# Patient Record
Sex: MALE | Race: WHITE | Employment: OTHER | URBAN - NONMETROPOLITAN AREA
[De-identification: names, ages, dates, MRNs, and addresses within clinical notes are randomized per-mention and may not be internally consistent; named-entity substitution may affect disease eponyms.]

---

## 2022-10-12 PROBLEM — J18.9 PNEUMONIA OF RIGHT LOWER LOBE DUE TO INFECTIOUS ORGANISM: Status: ACTIVE | Noted: 2022-10-12

## 2022-11-28 PROBLEM — E10.9 TYPE 1 DIABETES MELLITUS WITHOUT COMPLICATION (HCC): Status: ACTIVE | Noted: 2022-11-28

## 2023-06-20 PROBLEM — M54.50 CHRONIC LOW BACK PAIN: Status: ACTIVE | Noted: 2023-06-20

## 2023-06-20 PROBLEM — R20.2 PARESTHESIA OF BOTH LEGS: Status: ACTIVE | Noted: 2023-06-20

## 2023-06-20 PROBLEM — G57.93 NEUROPATHY INVOLVING BOTH LOWER EXTREMITIES: Status: ACTIVE | Noted: 2023-06-20

## 2023-06-20 PROBLEM — G89.29 CHRONIC LOW BACK PAIN: Status: ACTIVE | Noted: 2023-06-20

## 2023-06-20 PROBLEM — M54.17 LUMBOSACRAL RADICULOPATHY: Status: ACTIVE | Noted: 2023-06-20

## 2024-04-09 ENCOUNTER — ANESTHESIA EVENT (OUTPATIENT)
Dept: OPERATING ROOM | Age: 84
DRG: 661 | End: 2024-04-09
Payer: MEDICARE

## 2024-04-09 ENCOUNTER — HOSPITAL ENCOUNTER (OUTPATIENT)
Dept: CT IMAGING | Age: 84
Discharge: HOME | DRG: 661 | End: 2024-04-11
Payer: MEDICARE

## 2024-04-09 ENCOUNTER — TELEPHONE (OUTPATIENT)
Dept: UROLOGY | Age: 84
End: 2024-04-09

## 2024-04-09 ENCOUNTER — HOSPITAL ENCOUNTER (INPATIENT)
Age: 84
LOS: 1 days | Discharge: HOME | DRG: 661 | End: 2024-04-11
Attending: UROLOGY | Admitting: UROLOGY
Payer: MEDICARE

## 2024-04-09 ENCOUNTER — HOSPITAL ENCOUNTER (OUTPATIENT)
Age: 84
Discharge: HOME | DRG: 661 | End: 2024-04-09
Payer: MEDICARE

## 2024-04-09 ENCOUNTER — APPOINTMENT (OUTPATIENT)
Dept: GENERAL RADIOLOGY | Age: 84
DRG: 661 | End: 2024-04-09
Attending: UROLOGY
Payer: MEDICARE

## 2024-04-09 ENCOUNTER — OFFICE VISIT (OUTPATIENT)
Dept: UROLOGY | Age: 84
DRG: 661 | End: 2024-04-09
Payer: MEDICARE

## 2024-04-09 ENCOUNTER — ANESTHESIA (OUTPATIENT)
Dept: OPERATING ROOM | Age: 84
DRG: 661 | End: 2024-04-09
Payer: MEDICARE

## 2024-04-09 VITALS
DIASTOLIC BLOOD PRESSURE: 68 MMHG | TEMPERATURE: 98 F | BODY MASS INDEX: 22.6 KG/M2 | HEART RATE: 66 BPM | SYSTOLIC BLOOD PRESSURE: 115 MMHG | WEIGHT: 140 LBS

## 2024-04-09 DIAGNOSIS — N13.30 HYDRONEPHROSIS DETERMINED BY ULTRASOUND: ICD-10-CM

## 2024-04-09 DIAGNOSIS — N13.2 URETERAL STONE WITH HYDRONEPHROSIS: ICD-10-CM

## 2024-04-09 DIAGNOSIS — N17.9 AKI (ACUTE KIDNEY INJURY) (HCC): ICD-10-CM

## 2024-04-09 DIAGNOSIS — R11.2 NAUSEA AND VOMITING, UNSPECIFIED VOMITING TYPE: ICD-10-CM

## 2024-04-09 DIAGNOSIS — N17.9 AKI (ACUTE KIDNEY INJURY) (HCC): Primary | ICD-10-CM

## 2024-04-09 PROBLEM — N30.00 ACUTE CYSTITIS WITHOUT HEMATURIA: Status: ACTIVE | Noted: 2024-04-09

## 2024-04-09 PROBLEM — J18.9 PNEUMONIA OF RIGHT LOWER LOBE DUE TO INFECTIOUS ORGANISM: Status: RESOLVED | Noted: 2022-10-12 | Resolved: 2024-04-09

## 2024-04-09 LAB
ANION GAP SERPL CALCULATED.3IONS-SCNC: 9 MMOL/L (ref 9–17)
BASOPHILS # BLD: 0.03 K/UL (ref 0–0.2)
BASOPHILS NFR BLD: 0 % (ref 0–2)
BUN SERPL-MCNC: 33 MG/DL (ref 8–23)
BUN/CREAT SERPL: 14 (ref 9–20)
CALCIUM SERPL-MCNC: 9.2 MG/DL (ref 8.6–10.4)
CHLORIDE SERPL-SCNC: 99 MMOL/L (ref 98–107)
CO2 SERPL-SCNC: 23 MMOL/L (ref 20–31)
CREAT SERPL-MCNC: 2.3 MG/DL (ref 0.7–1.2)
EKG ATRIAL RATE: 68 BPM
EKG P AXIS: 53 DEGREES
EKG P-R INTERVAL: 162 MS
EKG Q-T INTERVAL: 436 MS
EKG QRS DURATION: 142 MS
EKG QTC CALCULATION (BAZETT): 463 MS
EKG R AXIS: -103 DEGREES
EKG T AXIS: 27 DEGREES
EKG VENTRICULAR RATE: 68 BPM
EOSINOPHIL # BLD: 0.03 K/UL (ref 0–0.44)
EOSINOPHILS RELATIVE PERCENT: 0 % (ref 1–4)
ERYTHROCYTE [DISTWIDTH] IN BLOOD BY AUTOMATED COUNT: 12.9 % (ref 11.8–14.4)
GFR SERPL CREATININE-BSD FRML MDRD: 28 ML/MIN/1.73M2
GLUCOSE SERPL-MCNC: 126 MG/DL (ref 70–99)
HCT VFR BLD AUTO: 41.7 % (ref 40.7–50.3)
HGB BLD-MCNC: 14.2 G/DL (ref 13–17)
IMM GRANULOCYTES # BLD AUTO: 0.04 K/UL (ref 0–0.3)
IMM GRANULOCYTES NFR BLD: 0 %
LYMPHOCYTES NFR BLD: 1.98 K/UL (ref 1.1–3.7)
LYMPHOCYTES RELATIVE PERCENT: 17 % (ref 24–43)
MCH RBC QN AUTO: 30.2 PG (ref 25.2–33.5)
MCHC RBC AUTO-ENTMCNC: 34.1 G/DL (ref 28.4–34.8)
MCV RBC AUTO: 88.7 FL (ref 82.6–102.9)
MONOCYTES NFR BLD: 1.15 K/UL (ref 0.1–1.2)
MONOCYTES NFR BLD: 10 % (ref 3–12)
NEUTROPHILS NFR BLD: 73 % (ref 36–65)
NEUTS SEG NFR BLD: 8.34 K/UL (ref 1.5–8.1)
NRBC BLD-RTO: 0 PER 100 WBC
PLATELET # BLD AUTO: 213 K/UL (ref 138–453)
PMV BLD AUTO: 10.3 FL (ref 8.1–13.5)
POTASSIUM SERPL-SCNC: 4.7 MMOL/L (ref 3.7–5.3)
RBC # BLD AUTO: 4.7 M/UL (ref 4.21–5.77)
SODIUM SERPL-SCNC: 131 MMOL/L (ref 135–144)
WBC OTHER # BLD: 11.6 K/UL (ref 3.5–11.3)

## 2024-04-09 PROCEDURE — 99205 OFFICE O/P NEW HI 60 MIN: CPT | Performed by: NURSE PRACTITIONER

## 2024-04-09 PROCEDURE — 36415 COLL VENOUS BLD VENIPUNCTURE: CPT

## 2024-04-09 PROCEDURE — 80048 BASIC METABOLIC PNL TOTAL CA: CPT

## 2024-04-09 PROCEDURE — 93010 ELECTROCARDIOGRAM REPORT: CPT | Performed by: FAMILY MEDICINE

## 2024-04-09 PROCEDURE — 2580000003 HC RX 258: Performed by: NURSE ANESTHETIST, CERTIFIED REGISTERED

## 2024-04-09 PROCEDURE — 87086 URINE CULTURE/COLONY COUNT: CPT

## 2024-04-09 PROCEDURE — 2500000003 HC RX 250 WO HCPCS: Performed by: NURSE ANESTHETIST, CERTIFIED REGISTERED

## 2024-04-09 PROCEDURE — 6360000002 HC RX W HCPCS: Performed by: NURSE ANESTHETIST, CERTIFIED REGISTERED

## 2024-04-09 PROCEDURE — 74176 CT ABD & PELVIS W/O CONTRAST: CPT

## 2024-04-09 PROCEDURE — 85025 COMPLETE CBC W/AUTO DIFF WBC: CPT

## 2024-04-09 PROCEDURE — 93005 ELECTROCARDIOGRAM TRACING: CPT

## 2024-04-09 PROCEDURE — 1123F ACP DISCUSS/DSCN MKR DOCD: CPT | Performed by: NURSE PRACTITIONER

## 2024-04-09 RX ORDER — SODIUM CHLORIDE, SODIUM LACTATE, POTASSIUM CHLORIDE, CALCIUM CHLORIDE 600; 310; 30; 20 MG/100ML; MG/100ML; MG/100ML; MG/100ML
INJECTION, SOLUTION INTRAVENOUS CONTINUOUS PRN
Status: DISCONTINUED | OUTPATIENT
Start: 2024-04-09 | End: 2024-04-09 | Stop reason: SDUPTHER

## 2024-04-09 RX ORDER — PROPOFOL 10 MG/ML
INJECTION, EMULSION INTRAVENOUS PRN
Status: DISCONTINUED | OUTPATIENT
Start: 2024-04-09 | End: 2024-04-09 | Stop reason: SDUPTHER

## 2024-04-09 RX ORDER — LIDOCAINE HYDROCHLORIDE 20 MG/ML
INJECTION, SOLUTION EPIDURAL; INFILTRATION; INTRACAUDAL; PERINEURAL PRN
Status: DISCONTINUED | OUTPATIENT
Start: 2024-04-09 | End: 2024-04-09 | Stop reason: SDUPTHER

## 2024-04-09 RX ORDER — DEXAMETHASONE SODIUM PHOSPHATE 4 MG/ML
INJECTION, SOLUTION INTRA-ARTICULAR; INTRALESIONAL; INTRAMUSCULAR; INTRAVENOUS; SOFT TISSUE PRN
Status: DISCONTINUED | OUTPATIENT
Start: 2024-04-09 | End: 2024-04-09 | Stop reason: SDUPTHER

## 2024-04-09 RX ORDER — DEXMEDETOMIDINE HYDROCHLORIDE 100 UG/ML
INJECTION, SOLUTION INTRAVENOUS PRN
Status: DISCONTINUED | OUTPATIENT
Start: 2024-04-09 | End: 2024-04-09 | Stop reason: SDUPTHER

## 2024-04-09 RX ADMIN — DEXMEDETOMIDINE HCL 4 MCG: 100 INJECTION INTRAVENOUS at 18:14

## 2024-04-09 RX ADMIN — LIDOCAINE HYDROCHLORIDE 100 MG: 20 INJECTION, SOLUTION EPIDURAL; INFILTRATION; INTRACAUDAL; PERINEURAL at 16:48

## 2024-04-09 RX ADMIN — DEXMEDETOMIDINE HCL 4 MCG: 100 INJECTION INTRAVENOUS at 18:28

## 2024-04-09 RX ADMIN — DEXAMETHASONE SODIUM PHOSPHATE 4 MG: 4 INJECTION INTRA-ARTICULAR; INTRALESIONAL; INTRAMUSCULAR; INTRAVENOUS; SOFT TISSUE at 16:48

## 2024-04-09 RX ADMIN — PROPOFOL 150 MG: 10 INJECTION, EMULSION INTRAVENOUS at 16:48

## 2024-04-09 RX ADMIN — PHENYLEPHRINE HYDROCHLORIDE 100 MCG: 10 INJECTION INTRAVENOUS at 17:21

## 2024-04-09 RX ADMIN — DEXMEDETOMIDINE HCL 4 MCG: 100 INJECTION INTRAVENOUS at 18:31

## 2024-04-09 RX ADMIN — SODIUM CHLORIDE, POTASSIUM CHLORIDE, SODIUM LACTATE AND CALCIUM CHLORIDE: 600; 310; 30; 20 INJECTION, SOLUTION INTRAVENOUS at 16:45

## 2024-04-09 RX ADMIN — DEXMEDETOMIDINE HCL 4 MCG: 100 INJECTION INTRAVENOUS at 18:18

## 2024-04-09 RX ADMIN — DEXMEDETOMIDINE HCL 4 MCG: 100 INJECTION INTRAVENOUS at 18:16

## 2024-04-09 ASSESSMENT — ENCOUNTER SYMPTOMS
CONSTIPATION: 0
EYE REDNESS: 0
ABDOMINAL PAIN: 1
WHEEZING: 0
BACK PAIN: 0
VOMITING: 0
COUGH: 0
SHORTNESS OF BREATH: 0
NAUSEA: 1
COLOR CHANGE: 0

## 2024-04-09 ASSESSMENT — PAIN DESCRIPTION - DESCRIPTORS: DESCRIPTORS: STABBING

## 2024-04-09 ASSESSMENT — PAIN SCALES - GENERAL
PAINLEVEL_OUTOF10: 5
PAINLEVEL_OUTOF10: 5
PAINLEVEL_OUTOF10: 3

## 2024-04-09 ASSESSMENT — PAIN - FUNCTIONAL ASSESSMENT
PAIN_FUNCTIONAL_ASSESSMENT: FACE, LEGS, ACTIVITY, CRY, AND CONSOLABILITY (FLACC)
PAIN_FUNCTIONAL_ASSESSMENT: ACTIVITIES ARE NOT PREVENTED

## 2024-04-09 ASSESSMENT — PAIN DESCRIPTION - LOCATION: LOCATION: ABDOMEN

## 2024-04-09 ASSESSMENT — PAIN DESCRIPTION - ONSET: ONSET: SUDDEN

## 2024-04-09 ASSESSMENT — PAIN DESCRIPTION - PAIN TYPE: TYPE: ACUTE PAIN

## 2024-04-09 ASSESSMENT — PAIN DESCRIPTION - DIRECTION: RADIATING_TOWARDS: RIGHT FLANK

## 2024-04-09 ASSESSMENT — PAIN DESCRIPTION - ORIENTATION: ORIENTATION: RIGHT

## 2024-04-09 ASSESSMENT — PAIN DESCRIPTION - FREQUENCY: FREQUENCY: INTERMITTENT

## 2024-04-09 NOTE — PROGRESS NOTES
HPI:          Patient is a 83 y.o. male in no acute distress.  He is alert and oriented to person, place, and time.         New patient referral for right flank, RLQ pain and MARYSE.  He denies any dysuria, gross hematuria, frequency or urgency.  He did have a KUB and renal ultrasound completed.  These films are independently reviewed and shows a 2cm calcification along the course of the proximal right ureter.  The renal ultrasound shows right hydroureteronephrosis.  I do not see any right ureteral jet.  The hospital was not able to complete a CT scan because the machine was down. Creatinine is 1.8.  He did leave the ER yesterday AMA because he did not want to be admitted.  He has not had anything to eat or drink since before midnight except for Zofran this morning.    We did send him for a stat CT scan and repeat labs.  This film was independently reviewed and shows a 2 cm proximal right ureteral stone with significant hydronephrosis.  Creatinine is 2.3.  GFR 28.  WBC 11.6.        Past Medical History:   Diagnosis Date    Benign tumor of throat     at age 21    Diabetes mellitus (HCC)     Glaucoma     Kidney stone     Pneumonia      Past Surgical History:   Procedure Laterality Date    EMG  6/20/2023    LITHOTRIPSY N/A      Outpatient Encounter Medications as of 4/9/2024   Medication Sig Dispense Refill    ondansetron (ZOFRAN-ODT) 4 MG disintegrating tablet Place 1 tablet under the tongue every 8 hours as needed for Nausea or Vomiting May Sub regular tablet (non-ODT) if insurance does not cover ODT. 20 tablet 0    insulin glargine (LANTUS SOLOSTAR) 100 UNIT/ML injection pen Inject 12 Units into the skin nightly 5 Adjustable Dose Pre-filled Pen Syringe 1    tamsulosin (FLOMAX) 0.4 MG capsule Take 1 capsule by mouth daily for 5 days (Patient not taking: Reported on 4/9/2024) 5 capsule 0    HYDROcodone-acetaminophen (NORCO) 5-325 MG per tablet Take 1 tablet by mouth every 6 hours as needed for Pain for up to 3 days.

## 2024-04-09 NOTE — CARE COORDINATION
Case Management Assessment  Initial Evaluation    Date/Time of Evaluation: 4/9/2024 2:16 PM  Assessment Completed by: JOANNA Hayden    If patient is discharged prior to next notation, then this note serves as note for discharge by case management.    Patient Name: Jose Yanes                   YOB: 1940  Diagnosis: Acute kidney injury (HCC) [N17.9]  Stone in kidney [N20.0]  MARYSE (acute kidney injury) (HCC) [N17.9]                   Date / Time: 4/9/2024 12:43 PM    Patient Admission Status: Observation   Readmission Risk (Low < 19, Mod (19-27), High > 27): No data recorded  Current PCP: Beitler, Claude, PA-C  PCP verified by CM? Yes    Chart Reviewed: Yes      History Provided by: Patient, Significant Other  Patient Orientation: Alert and Oriented, Person, Place, Situation, Self    Patient Cognition: Alert    Hospitalization in the last 30 days (Readmission):  No    If yes, Readmission Assessment in CM Navigator will be completed.    Advance Directives:      Code Status: Not on file   Patient's Primary Decision Maker is: Legal Next of Kin    Primary Decision Maker: AMAN YANES - Spouse - [phone]    Discharge Planning:    Patient lives with: Spouse/Significant Other Type of Home: House  Primary Care Giver: Self  Patient Support Systems include: Spouse/Significant Other, Family Members   Current Financial resources: Medicare, Other (Comment) (commercial)  Current community resources: None  Current services prior to admission: None            Current DME:              Type of Home Care services:  None    ADLS  Prior functional level: Independent in ADLs/IADLs  Current functional level: Independent in ADLs/IADLs    PT AM-PAC:   /24  OT AM-PAC:   /24    Family can provide assistance at DC: Yes  Would you like Case Management to discuss the discharge plan with any other family members/significant others, and if so, who? No  Plans to Return to Present Housing: Yes  Other Identified

## 2024-04-09 NOTE — CONSULTS
CECILIA Kirkpatrick-CNP  Inpatient Consult Note 4/9/24    Patient:  Jose Reyes  MRN: [id number]    Reason for Consultation: Medical management    Requesting Physician: Desiree Tomlinson CNP urology    History Obtained From:  patient, electronic medical record  PCP: Beitler, Claude, PA-C    History of Present Illness:   The patient is a 83 y.o. male who presented as a direct admission from urology office with right flank pain and right lower quadrant abdominal pain as well as MARYSE.  Patient was seen in the emergency room on 4/8/2024 for complaints of flank pain nausea and vomiting.  Symptom onset 2 days.  He denied dysuria, frequency or urgency.  He reported history of kidney stones.  Patient was afebrile at that time.  Ultrasound of his kidneys showed mild to severe right hydronephrosis without right ureteral jet into the bladder consistent with ureteral obstruction.  Urinalysis was positive for UTI and he was started on oral Keflex.  BUN was 26 and 1.86.  At that time they were recommending admission however patient left AMA as he did not want to be admitted.  Patient returned to urology office today for workup and was sent for repeat imaging as well as labs.  His creatinine was elevated to 2.3 with a mild leukocytosis of 11.6.  CT scan showed 2 cm proximal ureteral stone with significant hydronephrosis.  Plan was for cystoscopy with stent placement and stone therapy and patient was agreeable to admission.  Patient states no bowel movement since Saturday evening.  He states normally he has 3 or 4 a day.  He states he just does not feel well.    Past Medical History:        Diagnosis Date    Benign tumor of throat     at age 21    Diabetes mellitus (HCC)     Glaucoma     Kidney stone     Pneumonia        Past Surgical History:        Procedure Laterality Date    EMG  6/20/2023    LITHOTRIPSY N/A        Medications Prior to Admission:    Prior to Admission medications    Medication Sig Start Date End Date Taking?  treatment would jeopardize the patient's health    []  I did NOT confirm, update or review the patient's current list of medications today.  [DOES NOT SATISFY MIPS PERFORMANCE]    Critical Care Time:  Total critical care time caring for this patient with life threatening, unstable organ failure, including direct patient contact, management of life support systems, review of data including imaging and labs, discussions with other team members and physicians at least 0 minutes so far today, excluding separately billable procedures.    Thank you very much for allowing me to participate in the care of this patient.     Dominique Burns, APRN - CNP, APRN-CNP

## 2024-04-09 NOTE — ANESTHESIA PRE PROCEDURE
Status: Time of last liquid consumption: 0800                        Time of last solid consumption: 1600                        Date of last liquid consumption: 04/09/24                        Date of last solid food consumption: 04/08/24    BMI:   Wt Readings from Last 3 Encounters:   04/09/24 64 kg (141 lb)   04/09/24 63.5 kg (140 lb)   04/08/24 63.5 kg (140 lb)     Body mass index is 22.76 kg/m².    CBC:   Lab Results   Component Value Date/Time    WBC 11.6 04/09/2024 10:43 AM    RBC 4.70 04/09/2024 10:43 AM    RBC 5.24 04/08/2024 12:26 PM    HGB 14.2 04/09/2024 10:43 AM    HCT 41.7 04/09/2024 10:43 AM    MCV 88.7 04/09/2024 10:43 AM    RDW 12.9 04/09/2024 10:43 AM     04/09/2024 10:43 AM       CMP:   Lab Results   Component Value Date/Time     04/09/2024 10:43 AM    K 4.7 04/09/2024 10:43 AM    CL 99 04/09/2024 10:43 AM    CO2 23 04/09/2024 10:43 AM    BUN 33 04/09/2024 10:43 AM    CREATININE 2.3 04/09/2024 10:43 AM    LABGLOM 28 04/09/2024 10:43 AM    GLUCOSE 126 04/09/2024 10:43 AM    GLUCOSE 167 04/08/2024 12:26 PM    PROT 6.5 12/06/2022 04:17 PM    CALCIUM 9.2 04/09/2024 10:43 AM    BILITOT 1.2 12/06/2022 04:17 PM    ALKPHOS 126 12/06/2022 04:17 PM    AST 32 12/06/2022 04:17 PM    ALT 14 12/06/2022 04:17 PM       POC Tests: No results for input(s): \"POCGLU\", \"POCNA\", \"POCK\", \"POCCL\", \"POCBUN\", \"POCHEMO\", \"POCHCT\" in the last 72 hours.    Coags: No results found for: \"PROTIME\", \"INR\", \"APTT\"    HCG (If Applicable): No results found for: \"PREGTESTUR\", \"PREGSERUM\", \"HCG\", \"HCGQUANT\"     ABGs: No results found for: \"PHART\", \"PO2ART\", \"QIN9UER\", \"HRG0HLE\", \"BEART\", \"F1PJITMQ\"     Type & Screen (If Applicable):  No results found for: \"LABABO\", \"LABRH\"    Drug/Infectious Status (If Applicable):  No results found for: \"HIV\", \"HEPCAB\"    COVID-19 Screening (If Applicable): No results found for: \"COVID19\"        Anesthesia Evaluation     no history of anesthetic complications:   Airway: Mallampati:

## 2024-04-09 NOTE — TELEPHONE ENCOUNTER
New patient-ER - try to put him on at 945 this morning.  Make sure he has not eaten or drank anything.

## 2024-04-09 NOTE — H&P (VIEW-ONLY)
Pipe    Smokeless tobacco: Never   Vaping Use    Vaping Use: Never used   Substance and Sexual Activity    Alcohol use: Never    Drug use: Never    Sexual activity: Not on file   Other Topics Concern    Not on file   Social History Narrative    Not on file     Social Determinants of Health     Financial Resource Strain: Not on file   Food Insecurity: No Food Insecurity (4/9/2024)    Hunger Vital Sign     Worried About Running Out of Food in the Last Year: Never true     Ran Out of Food in the Last Year: Never true   Transportation Needs: No Transportation Needs (4/9/2024)    PRAPARE - Transportation     Lack of Transportation (Medical): No     Lack of Transportation (Non-Medical): No   Physical Activity: Not on file   Stress: Not on file   Social Connections: Not on file   Intimate Partner Violence: Not on file   Housing Stability: Low Risk  (4/9/2024)    Housing Stability Vital Sign     Unable to Pay for Housing in the Last Year: No     Number of Places Lived in the Last Year: 1     Unstable Housing in the Last Year: No       Family History:    Family History   Problem Relation Age of Onset    Unknown Mother     Unknown Father        REVIEW OF SYSTEMS:  Constitutional: Negative for fever, chills and unexpected weight change.   Respiratory: Negative for shortness of breath and wheezing.   Cardiovascular: Negative for chest pain and palpitations.   Gastrointestinal: Positive for nausea and abdominal pain, negative for vomiting  Endocrine: Negative for polydipsia and polyuria.   Genitourinary: Positive for flank pain. Negative for dysuria, gross hematuria  Musculoskeletal: Negative for myalgias and joint swelling.   Skin: Negative for rash and wound.   Neurological: Negative for dizziness and headaches.   Hematological: Negative for adenopathy. Does not bruise/bleed easily.     PHYSICAL EXAM:  No data found.  Constitutional: Patient resting comfortably, in no acute distress.   Neuro: Alert and oriented to person place

## 2024-04-09 NOTE — ANESTHESIA POSTPROCEDURE EVALUATION
Department of Anesthesiology  Postprocedure Note    Patient: Jose Reyes  MRN: [id number]  YOB: 1940  Date of evaluation: 4/9/2024    Procedure Summary       Date: 04/09/24 Room / Location: [facility]    Anesthesia Start: 1645 Anesthesia Stop: 1745    Procedures:       CYSTOSCOPY URETEROSCOPY LASER- HLL AND CYSTOLITHOLAPAXY (Right)      CYSTOSCOPY URETERAL STENT INSERTION (Right) Diagnosis:       Acute kidney injury (HCC)      (Acute kidney injury (HCC) [N17.9])    Surgeons: Mehul Perry MD Responsible Provider: Migdalia Dolan APRN - CRNA    Anesthesia Type: general ASA Status: 2            Anesthesia Type: No value filed.    Delfino Phase I: Delfino Score: 10    Delfino Phase II: Delfino Score: 9    Anesthesia Post Evaluation    Patient location during evaluation: PACU  Patient participation: complete - patient participated  Level of consciousness: awake and alert  Pain score: 0  Airway patency: patent  Nausea & Vomiting: no vomiting and no nausea  Cardiovascular status: blood pressure returned to baseline  Respiratory status: acceptable  Hydration status: stable  Multimodal analgesia pain management approach  Pain management: adequate    No notable events documented.

## 2024-04-10 LAB
MICROORGANISM SPEC CULT: NORMAL
SPECIMEN DESCRIPTION: NORMAL

## 2024-04-10 NOTE — PROGRESS NOTES
Pt urine remains dark brown to dark red and without stone debris noted upon straining each output. Pain with urination has improved t/o shift. Will continue to monitor.

## 2024-04-10 NOTE — PROGRESS NOTES
Pt is A&Ox4, calm and cooperative. Assmt and VS completed as charted, see flow sheet. IV fluids continue as ordered, see MAR. Pt's urine is dark red, without stone debris noted upon straining. Pt c/o pain with urination. Pt resting in chair, call light within reach, denies pain except with urination, denies further needs. Will continue to monitor.

## 2024-04-10 NOTE — CONSULTS
Discussion this morning with Jose. States that his wife manages his ACP documents and he knows that he has paper work they completed some years ago but is unsure who he has listed as decision makers. Asks writer to come back later today when his wife returns.     Spoke with patient at his wife Jacque. Jacque states that Jose has a health care power of  and a living will at home with Jacque listed as primary decision maker, followed by their daughter Lois then their daughter Beba. ACP decision makers updated at this time. Copy of ACP documents requested when she is able. Jose is currently a full code and wishes to remain so. States that he would be ok with intubation short term but would never want to be on a ventilator long term. He shares about his mother having a stroke and not passing for 9 months. Support provided. Denies further needs at this time.    Shaye Givens RN  [facility]   Palliative Care Nurse Coordinator  4/10/2024 3:13 PM

## 2024-04-10 NOTE — ACP (ADVANCE CARE PLANNING)
Advance Care Planning     Palliative Team Advance Care Planning (ACP) Conversation    Date of Conversation: 04/10/24    Key individuals present for the conversation:  Patient with decision making capacity    Other individuals present: Spouse Wife Jacque     ACP documents on file prior to discussion:    [x] Advance Directive  [] Portable DNR  [] POLST  [] [facility] M[facility]  [] None    Healthcare Decision Maker:    Primary Decision Maker: JACQUE YANES - Spouse - [phone]    Secondary Decision Maker: Alis Bennetta - Child - [phone]    Supplemental (Other) Decision Maker: Beba Hernandez - Child - [phone]     Conversation Summary: See PC note    Resuscitation Status:   Code Status: Not on file     Completed Documentation:    [] Advance Directive  [] Portable DNR  [] POLST  [] [facility]  [x] No new documents completed    I spent 10 minutes with the patient and/or surrogate decision maker discussing the patient's wishes and goals as detailed in the above note.     Shaye Givens RN

## 2024-04-10 NOTE — PROGRESS NOTES
Patient returned to floor at this time. Patient alert and oriented x4 and is currently denying pain. Patient assisted with standing at bedside and using the urinal and output was bloody. Patient assessment and vitals completed at this time as well. Patient states no current needs, call light is in reach, plan of care is ongoing.

## 2024-04-10 NOTE — PLAN OF CARE
Problem: Discharge Planning  Goal: Discharge to home or other facility with appropriate resources  Outcome: Progressing     Problem: Safety - Adult  Goal: Free from fall injury  Outcome: Progressing     Problem: Pain  Goal: Verbalizes/displays adequate comfort level or baseline comfort level  Outcome: Progressing     Problem: Chronic Conditions and Co-morbidities  Goal: Patient's chronic conditions and co-morbidity symptoms are monitored and maintained or improved  Outcome: Progressing     Problem: Nutrition Deficit:  Goal: Optimize nutritional status  4/10/2024 1720 by Avelina Wilson RN  Outcome: Progressing  4/10/2024 0820 by Blanco Lezama, RD, LD  Outcome: Progressing  Flowsheets (Taken 4/10/2024 0820)  Nutrient intake appropriate for improving, restoring, or maintaining nutritional needs: Monitor oral intake, labs, and treatment plans  Note: Nutrition Problem #1: Altered nutrition-related lab values  Intervention: Food and/or Nutrient Delivery: Continue Current Diet

## 2024-04-10 NOTE — OP NOTE
[address] [address]                            OPERATIVE REPORT      PATIENT NAME: MANDEEP YANES               : 1940  MED REC NO: [id number]                          ROOM: 0302  ACCOUNT NO: [id number]                       ADMIT DATE: 2024  PROVIDER: Mehul Perry MD      DATE OF PROCEDURE:  2024    SURGEON:  Mehul Perry MD    ASSISTANT:  None.    PREOPERATIVE DIAGNOSIS:  Right ureteral calculus.    POSTOPERATIVE DIAGNOSIS:  Right ureteral calculus.    PROCEDURES:    1. Cystoscopy, right ureteroscopy, right holmium laser lithotripsy, right ureteral stent placement.  2. Cystolitholapaxy, small.    ANESTHESIA:  General.    COMPLICATIONS:  None.    ESTIMATED BLOOD LOSS:  Minimal.    SPECIMENS:  None.    PROSTHESIS:  A 6-Cymraes x 26 cm double-J ureteral stent.    DISPOSITION:  Stable.    FINDINGS:    1. 2-cm stone in the right ureter.  2. 1 to 2 cm stone burden in the bladder.    INDICATIONS:  The patient is an 83-year-old male who has extensive stone disease on the right side, here now for definitive therapy in the form of right-sided holmium laser lithotripsy.    DESCRIPTION OF PROCEDURE:  The patient was taken back to the operating room after informed consent including all risks, benefits, and alternatives was obtained.  The patient was transferred from [facility] onto the operating table, where he was induced under general anesthesia, given IV Ancef for preoperative antibiotic prophylaxis.  To begin the case, he was prepped and draped in normal sterile fashion, placed in dorsal lithotomy.  He had a 22-Cymraes sheath 30-degree lens passed through the urethra into the bladder.  Once in the bladder, we identified the right ureteral orifice.  We could see stones in the prostatic urethra and bladder.  These were pushed into the bladder.  At this point in time, we were able to place a 0.035-inch wire up.  We then

## 2024-04-10 NOTE — CONSULTS
CECILIA Kirkpatrick-CNP  Inpatient Consult Note 4/9/24    Patient:  Jose Reyes  MRN: [id number]    Reason for Consultation: Medical management    Requesting Physician: Desiree Tomlinson CNP urology    History Obtained From:  patient, electronic medical record  PCP: Beitler, Claude, PA-C    Subjective:   The patient is a 83 y.o. male who resting in bed with wife at side no distress. Urinating dark red urine. Afebrile. No further n/v.  No complaints of SOB or CP.        Review of Systems:  Pertinent items are noted in HPI.    Objective:    Vitals:   Vitals:    04/10/24 0415   BP: (!) 156/69   Pulse: 73   Resp:    Temp:    SpO2: 93%     Weight - Scale: 64 kg (141 lb)   Height: 167.6 cm (5' 6\")   -----------------------------------------------------------------  Exam:  General Appearance:  awake, alert, oriented, in no acute distress and appears frail  Head/face:  NCAT  Eyes:  No gross abnormalities. and PERRL  Nose/Sinuses:  negative  Mouth/Throat:  Mucosa moist.  No lesions.  Pharynx without erythema, edema or exudate.  Neck:  neck- supple, no mass, non-tender  Lungs:  Normal expansion.  Clear to auscultation.  No rales, rhonchi, or wheezing.  Heart:  Heart sounds are normal.  Regular rate and rhythm without murmur, gallop or rub.  Abdomen: Soft right lower quadrant tenderness with right CVA tenderness.  Bowel sounds active.  Genitourinary:  dark red urine  Extremities: Extremities warm to touch, pink, with no edema.  Musculoskeletal:  negative  Peripheral Pulses:  Normal, Capillary refill <2secs, strong peripheral pulses  Neurologic:  negative  Skin:  Skin color, texture, turgor normal. No rashes or lesions.      -----------------------------------------------------------------  Diagnostic Data: Reviewed    Assessment:          Principal Problem:    Ureteral stone with hydronephrosis  Active Problems:    Type 1 diabetes mellitus without complication (HCC)    Paresthesia of both legs    MARYSE (acute kidney injury)

## 2024-04-10 NOTE — PROGRESS NOTES
Urology Progress Note    Subjective:   Patient is doing well.  He states that he is much more comfortable.  He is awake and alert.  He has no family at bedside.    Objective:  Patient is postop day 1 right HL L with right ureteral stent placement.  Patient also had a cystolitholopaxy.  Patient states that he had some urinary frequency but this is slowed down.  He does have hematuria.  Patient's creatinine was this morning was 2.4.  This is very similar to prior to the procedure which was 2.3.  Patient is getting 100 mL of normal saline an hour.  He denies any pain.  Patient's white blood cell count to go up to 14.3.  Patient is currently on Rocephin.  Cultures are pending.      REVIEW OF SYSTEMS:  Constitutional: Negative for fever, chills and unexpected weight change.   Respiratory: Negative for shortness of breath and wheezing.   Cardiovascular: Negative for chest pain and palpitations.   Gastrointestinal: Negative for nausea, vomiting, diarrhea, constipation  Endocrine: Negative for polydipsia and polyuria.   Genitourinary: Positive for hematuria, negative for urgency, negative for dysuria  Musculoskeletal: Negative for myalgias and joint swelling.   Skin: Negative for rash and wound.   Neurological: Negative for dizziness and headaches.   Hematological: Negative for adenopathy.     PHYSICAL EXAM:  Constitutional: Patient resting comfortably, in no acute distress.   Neuro: Alert and oriented to person place and time.   Psych: Mood and affect normal.  Skin: Warm, dry, non-diaphoretic  HEENT: normocephalic, atraumatic  Lungs: Respiratory effort normal, unlabored  Cardiovascular:  Normal peripheral pulses  Abdomen: Soft, non-tender, non-distended   : No CVA tenderness  Genital/Rectal: Not done  Extremities: Calves are non-tender, no edema    Patient Vitals for the past 24 hrs:   BP Temp Temp src Pulse Resp SpO2 Height Weight   04/10/24 0747 -- -- -- -- -- -- 1.676 m (5' 6\") --   04/10/24 0730 (!) 128/54 97.6 °F

## 2024-04-10 NOTE — PROGRESS NOTES
Comprehensive Nutrition Assessment    Type and Reason for Visit:  Initial    Nutrition Recommendations/Plan:   Encourage consistent carbohydrate in diet.      Malnutrition Assessment:  Malnutrition Status:  No malnutrition (04/10/24 0818)    Context:  Acute Illness     Findings of the 6 clinical characteristics of malnutrition:  Energy Intake:  Mild decrease in energy intake (Comment) (since Sunday)  Weight Loss:  No significant weight loss     Body Fat Loss:  No significant body fat loss     Muscle Mass Loss:  No significant muscle mass loss    Fluid Accumulation:  No significant fluid accumulation     Strength:  Not Performed    Nutrition Assessment:    Altered nutrition related labs r/t renal dysfunction, AEB elevated bun/creat post cystoscopy with stent. History of dysphagia which is fully corrected per interview. Diabetes control per reported A1C 6.3-6.5 (last records 8.2 in 1/23 in Epic). Wife reports use of stem cell patches for diabetes control. Could not report monitoring carbohydrate, only reports choosing organic and grass fed items when asking about carbohydrate.  Patient states hunger this AM and eager for breakfast. States good use of water for hydration regularly.    Nutrition Related Findings:    active b/s. no edema. Wound Type: None       Current Nutrition Intake & Therapies:    Average Meal Intake: Unable to assess (no PO records)  Average Supplements Intake: None Ordered  ADULT DIET; Regular; 3 carb choices (45 gm/meal)    Anthropometric Measures:  Height: 167.6 cm (5' 6\")  Ideal Body Weight (IBW): 142 lbs (65 kg)    Admission Body Weight: 64 kg (141 lb)  Current Body Weight: 64 kg (141 lb), 99.3 % IBW. Weight Source: Bed Scale  Current BMI (kg/m2): 22.8  Usual Body Weight: 63.5 kg (140 lb) (123# a year ago?)  % Weight Change (Calculated): 0.7  Weight Adjustment For: No Adjustment                 BMI Categories: Normal Weight (BMI 18.5-24.9)    Estimated Daily Nutrient Needs:  Energy  Requirements Based On: Kcal/kg  Weight Used for Energy Requirements: Current  Energy (kcal/day): 0554-9691 (23-28)  Weight Used for Protein Requirements: Current  Protein (g/day): 70-83 (1.1-1.3)  Method Used for Fluid Requirements: 1 ml/kcal  Fluid (ml/day): 1800    Nutrition Diagnosis:   Altered nutrition-related lab values related to renal dysfunction as evidenced by lab values    Lab Results   Component Value Date     04/10/2024    K 4.8 04/10/2024     04/10/2024    CO2 19 (L) 04/10/2024    BUN 38 (H) 04/10/2024    CREATININE 2.4 (H) 04/10/2024    GLUCOSE 130 (H) 04/10/2024    CALCIUM 8.4 (L) 04/10/2024    PROT 6.5 12/06/2022    LABALBU 3.7 12/06/2022    BILITOT 1.2 12/06/2022    ALKPHOS 126 12/06/2022    AST 32 12/06/2022    ALT 14 12/06/2022    LABGLOM 26 (L) 04/10/2024     Hemoglobin A1C   Date Value Ref Range Status   01/30/2023 8.2 (H) 4.0 - 5.6 % Final     Comment:       American Diabetes Association guidelines indicate that patients with HgbA1C in the range of   5.7-6.4% are at increased risk for development of diabetes, and intervention by lifestyle   modification may be beneficial. HgbA1C greater than or equal to 6.5% is considered   diagnostic of diabetes.    Hemoglobin A1c should not be used in patients with homozygous sickle cell trait, hemolytic   anemia, or other hemolytic diseases and recent significant or chronic blood loss or blood   transfusions. Alternative forms of testing such as glycated serum protein or glycated   albumin should be considered for these patients.       No results found for: \"VITD25\"    Nutrition Interventions:   Food and/or Nutrient Delivery: Continue Current Diet  Nutrition Education/Counseling: No recommendation at this time  Coordination of Nutrition Care: Continue to monitor while inpatient  Plan of Care discussed with: patient and spouse    Goals:     Goals: Meet at least 75% of estimated needs       Nutrition Monitoring and Evaluation:

## 2024-04-10 NOTE — PROGRESS NOTES
Call received from Dr. Perry regarding if pt is voiding, informed he has voided 400ml since 0600, red urine, no stone fragments noted. N.O. Post Void Residual once and report findings to Dr. Perry. Will continue to monitor.

## 2024-04-11 PROBLEM — N17.9 ACUTE KIDNEY INJURY (HCC): Status: ACTIVE | Noted: 2024-04-11

## 2024-04-11 NOTE — PROGRESS NOTES
Reviewed discharge instructions with patient and spouse.  Patient aware of no new medications.  Reviewed home medications with patient and when next dose is due.  Patient aware of need for repeat labs for 4/15/24.  Lab order forms given to patient.  Aware of follow up appointment with Dr. Perry.  Patient and wife stated that they don't see Claude Beitler, PA-C anymore that they had switched to Young Warner.  Wife stated that he already has an appointment with Dr. Warner on 4/23/24.  Reviewed diet, activity, and Dr. Perry's cystoscopy instructions.  Educational handout given on Ureteral Stent Placement: Post-op.  Questions answered.  Verbalizes understanding.  Copy of discharge instructions given to patient.  Writer informed patient and spouse that we would cancel the appointment with Claude Beitler and take his name off list as the PCP and add Dr. Young Warner.

## 2024-04-11 NOTE — DISCHARGE INSTR - DIET
Good nutrition is important when healing from an illness, injury, or surgery.  Follow any nutrition recommendations given to you during your hospital stay.   If you were given an oral nutrition supplement while in the hospital, continue to take this supplement at home.  You can take it with meals, in-between meals, and/or before bedtime. These supplements can be purchased at most local grocery stores, pharmacies, and chain [employer]-stores.   If you have any questions about your diet or nutrition, call the hospital and ask for the dietitian.  Regular, increase fluid intakes

## 2024-04-11 NOTE — PROGRESS NOTES
Urology Progress Note  CC: Right flank pain    Subjective: Afebrile vital signs stable    Patient Vitals for the past 24 hrs:   BP Temp Temp src Pulse Resp SpO2 Weight   04/11/24 0702 (!) 155/82 97.4 °F (36.3 °C) Temporal 73 18 93 % --   04/11/24 0400 -- -- -- -- -- -- 64.9 kg (143 lb)   04/11/24 0345 (!) 121/58 97.8 °F (36.6 °C) Temporal 71 16 96 % --   04/10/24 1845 136/62 97.1 °F (36.2 °C) Temporal 72 16 96 % --   04/10/24 1530 (!) 135/58 97.3 °F (36.3 °C) Temporal 61 18 98 % --       Intake/Output Summary (Last 24 hours) at 4/11/2024 0951  Last data filed at 4/11/2024 0130  Gross per 24 hour   Intake 1040 ml   Output 2425 ml   Net -1385 ml       Recent Labs     04/09/24  1043 04/10/24  0540 04/11/24  0510   WBC 11.6* 14.3* 9.4   HGB 14.2 12.4* 12.3*   HCT 41.7 35.1* 35.9*   MCV 88.7 86.5 86.9    179 202     Recent Labs     04/09/24  1043 04/10/24  0540 04/11/24  0510   * 136 140   K 4.7 4.8 4.4   CL 99 102 109*   CO2 23 19* 22   BUN 33* 38* 44*   CREATININE 2.3* 2.4* 1.9*       Recent Labs     04/08/24  1151   COLORU Yellow   WBCUA 26-50   RBCUA 26-50   MUCUS None Seen   BACTERIA Trace   CLARITYU Slightly Cloudy   LEUKOCYTESUR Moderate*   UROBILINOGEN 0.2 E.U./dL   BILIRUBINUR Negative   BLOODU Moderate*       Additional Lab/culture results:     ROS:  Constitutional:  Negative for appetite change, chills and fever.   Eyes:  Negative for redness and visual disturbance.   Respiratory:  Negative for cough, shortness of breath and wheezing.    Cardiovascular:  Negative for chest pain and leg swelling.   Gastrointestinal:  Negative for abdominal pain, constipation, nausea and vomiting.   Genitourinary:  Negative for decreased urine volume, difficulty urinating, dysuria, enuresis, flank pain, frequency, hematuria, penile discharge, penile pain, scrotal swelling, testicular pain and urgency.   Musculoskeletal:  Negative for back pain, joint swelling and myalgias.   Skin:  Negative for color change, rash

## 2024-04-11 NOTE — PLAN OF CARE
Problem: Discharge Planning  Goal: Discharge to home or other facility with appropriate resources  4/11/2024 1059 by Irene Warner, RN  Outcome: Progressing  4/10/2024 2244 by Hilda Valdivia RN  Outcome: Progressing     Problem: Safety - Adult  Goal: Free from fall injury  4/11/2024 1059 by Irene Warner, RN  Outcome: Progressing  4/10/2024 2244 by Hilda Valdivia RN  Outcome: Progressing     Problem: Pain  Goal: Verbalizes/displays adequate comfort level or baseline comfort level  4/11/2024 1059 by Irene Warner, RN  Outcome: Progressing  4/10/2024 2244 by Hilda Valdivia RN  Outcome: Progressing     Problem: Chronic Conditions and Co-morbidities  Goal: Patient's chronic conditions and co-morbidity symptoms are monitored and maintained or improved  4/11/2024 1059 by Irene Warner RN  Outcome: Progressing  4/10/2024 2244 by Hilda Valdivia RN  Outcome: Progressing     Problem: Nutrition Deficit:  Goal: Optimize nutritional status  4/11/2024 1059 by Irene Warner, RN  Outcome: Progressing  4/10/2024 2244 by Hilda Valdivia RN  Outcome: Progressing

## 2024-04-11 NOTE — DISCHARGE INSTRUCTIONS
SAME DAY SURGERY DISCHARGE INSTRUCTIONS    1.  Do not drive or operate hazardous machinery for 24 hours.    2.  Do not make important personal or business decisions for 24 hours.    3.  Do not drink alcoholic beverages for 24 hours.    4.  Do not smoke tobacco products for 24 hours.    5.  Eat light foods (Jell-O, soups, etc....) and drink plenty of fluids (water, Sprite, etc...) up to 8 glasses per day, as you can tolerate.    6.  Limit your activities for 24 hours.  Do not engage in heavy work until your surgeon gives you permission.      7.  Patient should not be left alone for 12-24 hours following surgical procedure.    8.  Wash hands before and after incision care.  It is important to practice good personal hygiene during the post op period.    9.  Call your surgeon for any questions regarding your surgery.    CYSTOSCOPY DISCHARGE INSTRUCTIONS    Possible burning during urination and/or blood tinged urine.    Drink 6-8 glasses of water for the next day or so.  (This helps to flush the urinary tract.)    Call Dr. Perry ([phone]) if you develop:    Fever over 100 degrees  Prolonged soreness/pain  Unusual bleeding/bruising  Unable to urinate or if urine is bloody  You cannot pass urine 8 hours after the test.  You have pain in your belly or your back just below your rib cage.  (This is called flank pain.)  You have frequent urge to urinate but can pass only small amounts of urine.    Call Dr. Perry office for follow-up appointment ([phone]).

## 2024-04-11 NOTE — PLAN OF CARE
Problem: Discharge Planning  Goal: Discharge to home or other facility with appropriate resources  4/11/2024 1123 by Silvia Parham RN  Outcome: Adequate for Discharge  4/11/2024 1059 by Irene Warner RN  Outcome: Progressing  4/10/2024 2244 by Hilda Valdivia RN  Outcome: Progressing     Problem: Safety - Adult  Goal: Free from fall injury  4/11/2024 1123 by Silvia Parham RN  Outcome: Adequate for Discharge  4/11/2024 1059 by Irene Warner RN  Outcome: Progressing  4/10/2024 2244 by Hilda Valdivia RN  Outcome: Progressing     Problem: Pain  Goal: Verbalizes/displays adequate comfort level or baseline comfort level  4/11/2024 1123 by Silvia Parham RN  Outcome: Adequate for Discharge  4/11/2024 1059 by Irene Warner RN  Outcome: Progressing  4/10/2024 2244 by Hilda Valdivia RN  Outcome: Progressing     Problem: Chronic Conditions and Co-morbidities  Goal: Patient's chronic conditions and co-morbidity symptoms are monitored and maintained or improved  4/11/2024 1123 by Silvia Parham RN  Outcome: Adequate for Discharge  4/11/2024 1059 by Irene Warner RN  Outcome: Progressing  4/10/2024 2244 by Hilda Valdivia RN  Outcome: Progressing     Problem: Nutrition Deficit:  Goal: Optimize nutritional status  4/11/2024 1123 by Silvia Parham RN  Outcome: Adequate for Discharge  4/11/2024 1059 by Irene Warner RN  Outcome: Progressing  4/10/2024 2244 by Hilda Valdivia RN  Outcome: Progressing

## 2024-04-11 NOTE — PROGRESS NOTES
Writer notified PETER Valente that patient was seeing things when he closed his eyes that were not there. New order received, call light is in reach, plan of care is ongoing.

## 2024-04-11 NOTE — PROGRESS NOTES
Patient shift assessment and vitals completed at this time as charted. Patient is alert and oriented x4 and is sitting up in chair with wife at his side. Patient denies pain at this time and states no current needs, call light is in reach, plan of care is ongoing.

## 2024-04-11 NOTE — CONSULTS
CECILIA Kirkpatrick-CNP  Inpatient Consult Note 4/9/24    Patient:  Jose Reyes  MRN: [id number]    Reason for Consultation: Medical management    Requesting Physician: Desiree Tomlinson CNP urology    History Obtained From:  patient, electronic medical record  PCP: Beitler, Claude, PA-C    Subjective:   The patient is a 83 y.o. male who resting in bed with wife at side no distress. No complaints. Slept well. Afebrile.         Review of Systems:  Pertinent items are noted in HPI.    Objective:    Vitals:   Vitals:    04/11/24 0345   BP: (!) 121/58   Pulse: 71   Resp: 16   Temp: 97.8 °F (36.6 °C)   SpO2: 96%     Weight - Scale: 64.9 kg (143 lb)   Height: 167.6 cm (5' 6\")   -----------------------------------------------------------------  Exam:  General Appearance:  awake, alert, oriented, in no acute distress and appears frail  Head/face:  NCAT  Eyes:  No gross abnormalities. and PERRL  Nose/Sinuses:  negative  Mouth/Throat:  Mucosa moist.  No lesions.  Pharynx without erythema, edema or exudate.  Neck:  neck- supple, no mass, non-tender  Lungs:  Normal expansion.  Clear to auscultation.  No rales, rhonchi, or wheezing.  Heart:  Heart sounds are normal.  Regular rate and rhythm without murmur, gallop or rub.  Abdomen: no tenderness.  Bowel sounds active.  Genitourinary:  clear  Extremities: Extremities warm to touch, pink, with no edema.  Musculoskeletal:  negative  Peripheral Pulses:  Normal, Capillary refill <2secs, strong peripheral pulses  Neurologic:  negative  Skin:  Skin color, texture, turgor normal. No rashes or lesions.      -----------------------------------------------------------------  Diagnostic Data: Reviewed    Assessment:          Principal Problem:    Ureteral stone with hydronephrosis  Active Problems:    Type 1 diabetes mellitus without complication (HCC)    Paresthesia of both legs    MARYSE (acute kidney injury) (HCC)    Acute cystitis without hematuria  Resolved Problems:    * No resolved

## 2024-04-11 NOTE — FLOWSHEET NOTE
Resting in bed. Vitals checked and assessment completed. Denies pin this morning. Up to bathroom. Voiding tea color urine with red flecks. No stones noted when screened. Up to chair at bedside. Call light within reach. Continue to monitor

## 2024-04-11 NOTE — DISCHARGE SUMMARY
DISCHARGE SUMMARY NOTE:      Patient Identification  PATIENT: Jose Reyes is a 83 y.o. male.  MRN: [id number]  :  1940  Admit Date:  2024  Discharge date:   No discharge date for patient encounter.                                   Disposition: home  Discharged Condition:  good  Discharge Diagnoses:   Patient Active Problem List   Diagnosis    Type 1 diabetes mellitus without complication (HCC)    Paresthesia of both legs    Chronic low back pain    Neuropathy involving both lower extremities    Lumbosacral radiculopathy    MARYSE (acute kidney injury) (HCC)    Ureteral stone with hydronephrosis    Acute cystitis without hematuria    Acute kidney injury (HCC)       Consults: none    Surgery: Right HLL/ cystolithalopaxy    Patient Instructions:   Activity: as tolerated  Diet: As tolerated  Patient told to follow up with Dr. Perry in 4 day(s).   Discharge Medications:      Medication List        CHANGE how you take these medications      Lantus SoloStar 100 UNIT/ML injection pen  Generic drug: insulin glargine  Inject 12 Units into the skin nightly  What changed: how much to take            CONTINUE taking these medications      blood glucose test strips  Test two times a day     cephALEXin 500 MG capsule  Commonly known as: Keflex  Take 1 capsule by mouth 2 times daily for 7 days     dorzolamide-timolol 2-0.5 % ophthalmic solution  Commonly known as: COSOPT     glucose monitoring kit  1 kit by Does not apply route daily     HYDROcodone-acetaminophen 5-325 MG per tablet  Commonly known as: Norco  Take 1 tablet by mouth every 6 hours as needed for Pain for up to 3 days. Max Daily Amount: 4 tablets     Lancets Misc  1 each by Does not apply route 2 times daily     latanoprost 0.005 % ophthalmic solution  Commonly known as: XALATAN     ondansetron 4 MG disintegrating tablet  Commonly known as: ZOFRAN-ODT  Place 1 tablet under the tongue every 8 hours as needed for Nausea or Vomiting May Sub regular tablet  (non-ODT) if insurance does not cover ODT.     Pen Needles 31G X 8 MM Misc  15 Units by Does not apply route every evening     REFRESH OP     tamsulosin 0.4 MG capsule  Commonly known as: FLOMAX  Take 1 capsule by mouth daily for 5 days              Hospital course: Patient was admitted to the hospital after right-sided holmium laser lithotripsy.  We did monitor blood work.  Patient's urine culture eventually was negative.  Patient's kidney function has improved significantly since having the procedure done.  Patient will be discharged home in stable condition.    Mehul Perry MD  11:05 AM 4/11/2024

## 2024-04-11 NOTE — PROGRESS NOTES
Writer present  to empty urine hat in toilet. Patient c/o increase in urination and also c/o seeing things when eyes are closed. Patient states he sees dogs, people and cars when he closed his eyes and can't fall asleep. Lexi REDDY notified.

## 2024-04-11 NOTE — PLAN OF CARE
Problem: Discharge Planning  Goal: Discharge to home or other facility with appropriate resources  4/10/2024 2244 by Hilda Valdivia RN  Outcome: Progressing     Problem: Safety - Adult  Goal: Free from fall injury  4/10/2024 2244 by Hilda Valdivia RN  Outcome: Progressing     Problem: Pain  Goal: Verbalizes/displays adequate comfort level or baseline comfort level  4/10/2024 2244 by Hilda Valdivia RN  Outcome: Progressing     Problem: Chronic Conditions and Co-morbidities  Goal: Patient's chronic conditions and co-morbidity symptoms are monitored and maintained or improved  4/10/2024 2244 by Hilda Valdivia RN  Outcome: Progressing     Problem: Nutrition Deficit:  Goal: Optimize nutritional status  4/10/2024 2244 by Hilda Valdivia RN  Outcome: Progressing

## 2024-04-15 ENCOUNTER — TELEPHONE (OUTPATIENT)
Dept: UROLOGY | Age: 84
End: 2024-04-15

## 2024-04-15 ENCOUNTER — HOSPITAL ENCOUNTER (OUTPATIENT)
Age: 84
Discharge: HOME | End: 2024-04-15
Payer: COMMERCIAL

## 2024-04-15 ENCOUNTER — OFFICE VISIT (OUTPATIENT)
Dept: UROLOGY | Age: 84
End: 2024-04-15
Payer: COMMERCIAL

## 2024-04-15 VITALS
SYSTOLIC BLOOD PRESSURE: 151 MMHG | DIASTOLIC BLOOD PRESSURE: 71 MMHG | BODY MASS INDEX: 22.76 KG/M2 | HEART RATE: 56 BPM | WEIGHT: 141 LBS | TEMPERATURE: 98.7 F

## 2024-04-15 DIAGNOSIS — N17.9 AKI (ACUTE KIDNEY INJURY) (HCC): ICD-10-CM

## 2024-04-15 DIAGNOSIS — N17.9 ACUTE KIDNEY INJURY (HCC): ICD-10-CM

## 2024-04-15 DIAGNOSIS — N17.9 AKI (ACUTE KIDNEY INJURY) (HCC): Primary | ICD-10-CM

## 2024-04-15 DIAGNOSIS — N13.2 URETERAL STONE WITH HYDRONEPHROSIS: ICD-10-CM

## 2024-04-15 DIAGNOSIS — N13.30 HYDRONEPHROSIS DETERMINED BY ULTRASOUND: ICD-10-CM

## 2024-04-15 DIAGNOSIS — N13.2 URETERAL STONE WITH HYDRONEPHROSIS: Primary | ICD-10-CM

## 2024-04-15 DIAGNOSIS — N30.00 ACUTE CYSTITIS WITHOUT HEMATURIA: ICD-10-CM

## 2024-04-15 LAB
ANION GAP SERPL CALCULATED.3IONS-SCNC: 9 MMOL/L (ref 9–17)
BACTERIA URNS QL MICRO: ABNORMAL
BASOPHILS # BLD: 0.03 K/UL (ref 0–0.2)
BASOPHILS NFR BLD: 0 % (ref 0–2)
BILIRUB UR QL STRIP: NEGATIVE
BUN SERPL-MCNC: 32 MG/DL (ref 8–23)
BUN/CREAT SERPL: 16 (ref 9–20)
CALCIUM SERPL-MCNC: 9.5 MG/DL (ref 8.6–10.4)
CHLORIDE SERPL-SCNC: 104 MMOL/L (ref 98–107)
CLARITY UR: CLEAR
CO2 SERPL-SCNC: 27 MMOL/L (ref 20–31)
COLOR UR: YELLOW
CREAT SERPL-MCNC: 2 MG/DL (ref 0.7–1.2)
EOSINOPHIL # BLD: 0.27 K/UL (ref 0–0.44)
EOSINOPHILS RELATIVE PERCENT: 3 % (ref 1–4)
EPI CELLS #/AREA URNS HPF: ABNORMAL /HPF (ref 0–5)
ERYTHROCYTE [DISTWIDTH] IN BLOOD BY AUTOMATED COUNT: 12.8 % (ref 11.8–14.4)
GFR SERPL CREATININE-BSD FRML MDRD: 33 ML/MIN/1.73M2
GLUCOSE SERPL-MCNC: 186 MG/DL (ref 70–99)
GLUCOSE UR STRIP-MCNC: ABNORMAL MG/DL
HCT VFR BLD AUTO: 38.7 % (ref 40.7–50.3)
HGB BLD-MCNC: 13.1 G/DL (ref 13–17)
HGB UR QL STRIP.AUTO: ABNORMAL
IMM GRANULOCYTES # BLD AUTO: 0.04 K/UL (ref 0–0.3)
IMM GRANULOCYTES NFR BLD: 1 %
KETONES UR STRIP-MCNC: NEGATIVE MG/DL
LEUKOCYTE ESTERASE UR QL STRIP: ABNORMAL
LYMPHOCYTES NFR BLD: 2.38 K/UL (ref 1.1–3.7)
LYMPHOCYTES RELATIVE PERCENT: 29 % (ref 24–43)
MCH RBC QN AUTO: 29.6 PG (ref 25.2–33.5)
MCHC RBC AUTO-ENTMCNC: 33.9 G/DL (ref 28.4–34.8)
MCV RBC AUTO: 87.6 FL (ref 82.6–102.9)
MONOCYTES NFR BLD: 0.63 K/UL (ref 0.1–1.2)
MONOCYTES NFR BLD: 8 % (ref 3–12)
NEUTROPHILS NFR BLD: 59 % (ref 36–65)
NEUTS SEG NFR BLD: 5.01 K/UL (ref 1.5–8.1)
NITRITE UR QL STRIP: NEGATIVE
NRBC BLD-RTO: 0 PER 100 WBC
PH UR STRIP: 6 [PH] (ref 5–9)
PLATELET # BLD AUTO: 261 K/UL (ref 138–453)
PMV BLD AUTO: 10.1 FL (ref 8.1–13.5)
POTASSIUM SERPL-SCNC: 5 MMOL/L (ref 3.7–5.3)
PROT UR STRIP-MCNC: ABNORMAL MG/DL
RBC # BLD AUTO: 4.42 M/UL (ref 4.21–5.77)
RBC #/AREA URNS HPF: ABNORMAL /HPF (ref 0–2)
SODIUM SERPL-SCNC: 140 MMOL/L (ref 135–144)
SP GR UR STRIP: 1.01 (ref 1.01–1.02)
UROBILINOGEN UR STRIP-ACNC: NORMAL EU/DL (ref 0–1)
WBC #/AREA URNS HPF: ABNORMAL /HPF (ref 0–5)
WBC OTHER # BLD: 8.4 K/UL (ref 3.5–11.3)

## 2024-04-15 PROCEDURE — 99214 OFFICE O/P EST MOD 30 MIN: CPT | Performed by: UROLOGY

## 2024-04-15 PROCEDURE — 80048 BASIC METABOLIC PNL TOTAL CA: CPT

## 2024-04-15 PROCEDURE — 36415 COLL VENOUS BLD VENIPUNCTURE: CPT

## 2024-04-15 PROCEDURE — 87086 URINE CULTURE/COLONY COUNT: CPT

## 2024-04-15 PROCEDURE — 81001 URINALYSIS AUTO W/SCOPE: CPT

## 2024-04-15 PROCEDURE — 85025 COMPLETE CBC W/AUTO DIFF WBC: CPT

## 2024-04-15 PROCEDURE — 1123F ACP DISCUSS/DSCN MKR DOCD: CPT | Performed by: UROLOGY

## 2024-04-15 NOTE — TELEPHONE ENCOUNTER
Jose Reyes     1940        male    [address] [facility] [address]                    Legal Guardian NO   If yes, Name:       Skilled Facility NO     If yes, Name:                                             Home Phone: [phone]         Cell Phone:    Telephone Information:   Mobile [phone]                                           Surgeon: Dr Perry Surgery Date: 04/23                    Time: NA    Procedure: Right Ureteroscopy Holmium Laser Lithotripsy with Possible Right Ureteral Stent placement/exchange  Duration:    Diagnosis: Kidney Stones   CPT Codes:  64595    Important Medical History:  In Epic    First Assistant NO  Special Inst/Equip/Implants: Regular    Nickel allergy :    NO  Latex Allergy: No         Cardiac Device:  No  If yes, need most recent pacemaker interrogation from Cardiologist:  Type of pacemaker:    Anesthesia:    General                       Admission Type:  Same Day                        Admit Prior to Day of Surgery: No    Case Location:  Ambulatory            Preadmission Testing:  Phone Call             PAT Date and Time: NA    Need Preop Cardiac Clearance: No  Need Pre-op/Medical Clearance:NO    Does Patient have Cardiologist/physician? Name of Physician:    NA    Special Needs Communication:  Hardy Lift NO        needed NO           Does patient sign for self YES

## 2024-04-17 ENCOUNTER — TELEPHONE (OUTPATIENT)
Dept: PREADMISSION TESTING | Age: 84
End: 2024-04-17

## 2024-04-17 ENCOUNTER — TELEPHONE (OUTPATIENT)
Dept: UROLOGY | Age: 84
End: 2024-04-17

## 2024-04-17 LAB
MICROORGANISM SPEC CULT: NO GROWTH
SPECIMEN DESCRIPTION: NORMAL

## 2024-04-17 NOTE — TELEPHONE ENCOUNTER
PAT call attempted x 3. No answer. Message left to return PAT call. Attempted to contact wife's phone number as well. Message left to return call.

## 2024-04-17 NOTE — PROGRESS NOTES
Patient's wife Jacque instructed on the pre-operative, intra-operative, and post-operative process. Patient's wife instructed on pt's NPO status. Medication instructions and pre operative instruction sheet reviewed over the phone with wife. Instructed wife that patient can use eye drops only the morning of surgery.

## 2024-04-17 NOTE — RESULT ENCOUNTER NOTE
Patient's urine culture was negative.  Patient did have blood in his urine likely secondary to stone.  Proceed with surgery as planned.    Kidney function is compromised but stable over the last 10 days, we do recommend that he proceeds with surgery as planned.  White blood cell count normal.

## 2024-04-17 NOTE — TELEPHONE ENCOUNTER
----- Message from Kevin Causey PA-C sent at 4/17/2024  2:11 PM EDT -----  Patient's urine culture was negative.  Patient did have blood in his urine likely secondary to stone.  Proceed with surgery as planned.    Kidney function is compromised but stable over the last 10 days, we do recommend that he proceeds with surgery as planned.  White blood cell count normal.

## 2024-04-17 NOTE — PROGRESS NOTES
Attempted PAT phone call; no answer; message left to return PAT phone call. Attempted to call emergency contact number and no answer. Message left to return PAT call. Tiny at Dr. Perry's office notified PAT call x 3 with no success.

## 2024-04-22 ENCOUNTER — ANESTHESIA EVENT (OUTPATIENT)
Dept: OPERATING ROOM | Age: 84
End: 2024-04-22
Payer: MEDICARE

## 2024-04-23 ENCOUNTER — ANESTHESIA (OUTPATIENT)
Dept: OPERATING ROOM | Age: 84
End: 2024-04-23
Payer: MEDICARE

## 2024-04-23 ENCOUNTER — APPOINTMENT (OUTPATIENT)
Dept: GENERAL RADIOLOGY | Age: 84
End: 2024-04-23
Attending: UROLOGY
Payer: MEDICARE

## 2024-04-23 ENCOUNTER — HOSPITAL ENCOUNTER (OUTPATIENT)
Age: 84
Setting detail: OUTPATIENT SURGERY
Discharge: HOME | End: 2024-04-23
Attending: UROLOGY | Admitting: UROLOGY
Payer: MEDICARE

## 2024-04-23 VITALS
SYSTOLIC BLOOD PRESSURE: 182 MMHG | RESPIRATION RATE: 16 BRPM | TEMPERATURE: 97.3 F | BODY MASS INDEX: 20.99 KG/M2 | OXYGEN SATURATION: 98 % | HEART RATE: 52 BPM | HEIGHT: 66 IN | WEIGHT: 130.6 LBS | DIASTOLIC BLOOD PRESSURE: 56 MMHG

## 2024-04-23 LAB — GLUCOSE BLD-MCNC: 119 MG/DL (ref 74–100)

## 2024-04-23 PROCEDURE — 2709999900 HC NON-CHARGEABLE SUPPLY: Performed by: UROLOGY

## 2024-04-23 PROCEDURE — 3700000000 HC ANESTHESIA ATTENDED CARE: Performed by: UROLOGY

## 2024-04-23 PROCEDURE — 7100000000 HC PACU RECOVERY - FIRST 15 MIN: Performed by: UROLOGY

## 2024-04-23 PROCEDURE — 6360000002 HC RX W HCPCS: Performed by: NURSE ANESTHETIST, CERTIFIED REGISTERED

## 2024-04-23 PROCEDURE — 6370000000 HC RX 637 (ALT 250 FOR IP)

## 2024-04-23 PROCEDURE — 2500000003 HC RX 250 WO HCPCS: Performed by: NURSE ANESTHETIST, CERTIFIED REGISTERED

## 2024-04-23 PROCEDURE — C1769 GUIDE WIRE: HCPCS | Performed by: UROLOGY

## 2024-04-23 PROCEDURE — 82947 ASSAY GLUCOSE BLOOD QUANT: CPT

## 2024-04-23 PROCEDURE — 7100000001 HC PACU RECOVERY - ADDTL 15 MIN: Performed by: UROLOGY

## 2024-04-23 PROCEDURE — C1758 CATHETER, URETERAL: HCPCS | Performed by: UROLOGY

## 2024-04-23 PROCEDURE — 2580000003 HC RX 258

## 2024-04-23 PROCEDURE — 6360000002 HC RX W HCPCS: Performed by: UROLOGY

## 2024-04-23 PROCEDURE — 7100000011 HC PHASE II RECOVERY - ADDTL 15 MIN: Performed by: UROLOGY

## 2024-04-23 PROCEDURE — 6370000000 HC RX 637 (ALT 250 FOR IP): Performed by: UROLOGY

## 2024-04-23 PROCEDURE — 3600000004 HC SURGERY LEVEL 4 BASE: Performed by: UROLOGY

## 2024-04-23 PROCEDURE — C1747 HC ENDOSCOPE, SINGLE, URINARY TRACT: HCPCS | Performed by: UROLOGY

## 2024-04-23 PROCEDURE — 3700000001 HC ADD 15 MINUTES (ANESTHESIA): Performed by: UROLOGY

## 2024-04-23 PROCEDURE — 7100000010 HC PHASE II RECOVERY - FIRST 15 MIN: Performed by: UROLOGY

## 2024-04-23 PROCEDURE — 3600000014 HC SURGERY LEVEL 4 ADDTL 15MIN: Performed by: UROLOGY

## 2024-04-23 PROCEDURE — 2720000010 HC SURG SUPPLY STERILE: Performed by: UROLOGY

## 2024-04-23 PROCEDURE — C2617 STENT, NON-COR, TEM W/O DEL: HCPCS | Performed by: UROLOGY

## 2024-04-23 DEVICE — URETERAL STENT
Type: IMPLANTABLE DEVICE | Site: URETER | Status: FUNCTIONAL
Brand: PERCUFLEX™ PLUS

## 2024-04-23 RX ORDER — NALOXONE HYDROCHLORIDE 0.4 MG/ML
INJECTION, SOLUTION INTRAMUSCULAR; INTRAVENOUS; SUBCUTANEOUS PRN
Status: DISCONTINUED | OUTPATIENT
Start: 2024-04-23 | End: 2024-04-23 | Stop reason: HOSPADM

## 2024-04-23 RX ORDER — SODIUM CHLORIDE 0.9 % (FLUSH) 0.9 %
5-40 SYRINGE (ML) INJECTION PRN
Status: DISCONTINUED | OUTPATIENT
Start: 2024-04-23 | End: 2024-04-23 | Stop reason: HOSPADM

## 2024-04-23 RX ORDER — ACETAMINOPHEN 325 MG/1
650 TABLET ORAL ONCE
Status: DISCONTINUED | OUTPATIENT
Start: 2024-04-23 | End: 2024-04-23 | Stop reason: HOSPADM

## 2024-04-23 RX ORDER — LIDOCAINE HYDROCHLORIDE 20 MG/ML
INJECTION, SOLUTION EPIDURAL; INFILTRATION; INTRACAUDAL; PERINEURAL PRN
Status: DISCONTINUED | OUTPATIENT
Start: 2024-04-23 | End: 2024-04-23 | Stop reason: SDUPTHER

## 2024-04-23 RX ORDER — LIDOCAINE HYDROCHLORIDE 20 MG/ML
JELLY TOPICAL PRN
Status: DISCONTINUED | OUTPATIENT
Start: 2024-04-23 | End: 2024-04-23 | Stop reason: ALTCHOICE

## 2024-04-23 RX ORDER — SODIUM CHLORIDE 0.9 % (FLUSH) 0.9 %
5-40 SYRINGE (ML) INJECTION EVERY 12 HOURS SCHEDULED
Status: CANCELLED | OUTPATIENT
Start: 2024-04-23

## 2024-04-23 RX ORDER — SODIUM CHLORIDE, SODIUM LACTATE, POTASSIUM CHLORIDE, CALCIUM CHLORIDE 600; 310; 30; 20 MG/100ML; MG/100ML; MG/100ML; MG/100ML
INJECTION, SOLUTION INTRAVENOUS CONTINUOUS
Status: DISCONTINUED | OUTPATIENT
Start: 2024-04-23 | End: 2024-04-23 | Stop reason: HOSPADM

## 2024-04-23 RX ORDER — DIMENHYDRINATE 50 MG
50 TABLET ORAL ONCE
Status: COMPLETED | OUTPATIENT
Start: 2024-04-23 | End: 2024-04-23

## 2024-04-23 RX ORDER — SODIUM CHLORIDE 9 MG/ML
INJECTION, SOLUTION INTRAVENOUS PRN
Status: DISCONTINUED | OUTPATIENT
Start: 2024-04-23 | End: 2024-04-23 | Stop reason: HOSPADM

## 2024-04-23 RX ORDER — SODIUM CHLORIDE 0.9 % (FLUSH) 0.9 %
5-40 SYRINGE (ML) INJECTION EVERY 12 HOURS SCHEDULED
Status: DISCONTINUED | OUTPATIENT
Start: 2024-04-23 | End: 2024-04-23 | Stop reason: HOSPADM

## 2024-04-23 RX ORDER — EPHEDRINE SULFATE/0.9% NACL/PF 25 MG/5 ML
SYRINGE (ML) INTRAVENOUS PRN
Status: DISCONTINUED | OUTPATIENT
Start: 2024-04-23 | End: 2024-04-23 | Stop reason: SDUPTHER

## 2024-04-23 RX ORDER — SODIUM CHLORIDE 0.9 % (FLUSH) 0.9 %
5-40 SYRINGE (ML) INJECTION PRN
Status: CANCELLED | OUTPATIENT
Start: 2024-04-23

## 2024-04-23 RX ORDER — DEXAMETHASONE SODIUM PHOSPHATE 4 MG/ML
INJECTION, SOLUTION INTRA-ARTICULAR; INTRALESIONAL; INTRAMUSCULAR; INTRAVENOUS; SOFT TISSUE PRN
Status: DISCONTINUED | OUTPATIENT
Start: 2024-04-23 | End: 2024-04-23 | Stop reason: SDUPTHER

## 2024-04-23 RX ORDER — CEFAZOLIN SODIUM IN 0.9 % NACL 2 G/100 ML
2000 PLASTIC BAG, INJECTION (ML) INTRAVENOUS
Status: COMPLETED | OUTPATIENT
Start: 2024-04-23 | End: 2024-04-23

## 2024-04-23 RX ORDER — FENTANYL CITRATE 50 UG/ML
INJECTION, SOLUTION INTRAMUSCULAR; INTRAVENOUS PRN
Status: DISCONTINUED | OUTPATIENT
Start: 2024-04-23 | End: 2024-04-23 | Stop reason: SDUPTHER

## 2024-04-23 RX ORDER — SODIUM CHLORIDE 9 MG/ML
INJECTION, SOLUTION INTRAVENOUS PRN
Status: CANCELLED | OUTPATIENT
Start: 2024-04-23

## 2024-04-23 RX ORDER — ONDANSETRON 2 MG/ML
INJECTION INTRAMUSCULAR; INTRAVENOUS PRN
Status: DISCONTINUED | OUTPATIENT
Start: 2024-04-23 | End: 2024-04-23 | Stop reason: SDUPTHER

## 2024-04-23 RX ORDER — PROPOFOL 10 MG/ML
INJECTION, EMULSION INTRAVENOUS PRN
Status: DISCONTINUED | OUTPATIENT
Start: 2024-04-23 | End: 2024-04-23 | Stop reason: SDUPTHER

## 2024-04-23 RX ADMIN — EPHEDRINE SULFATE 5 MG: 5 INJECTION INTRAVENOUS at 11:52

## 2024-04-23 RX ADMIN — Medication 2000 MG: at 11:40

## 2024-04-23 RX ADMIN — ONDANSETRON 4 MG: 2 INJECTION INTRAMUSCULAR; INTRAVENOUS at 11:56

## 2024-04-23 RX ADMIN — LIDOCAINE HYDROCHLORIDE 100 MG: 20 INJECTION, SOLUTION EPIDURAL; INFILTRATION; INTRACAUDAL; PERINEURAL at 11:47

## 2024-04-23 RX ADMIN — FENTANYL CITRATE 50 MCG: 50 INJECTION, SOLUTION INTRAMUSCULAR; INTRAVENOUS at 11:47

## 2024-04-23 RX ADMIN — EPHEDRINE SULFATE 10 MG: 5 INJECTION INTRAVENOUS at 12:00

## 2024-04-23 RX ADMIN — DEXAMETHASONE SODIUM PHOSPHATE 4 MG: 4 INJECTION INTRA-ARTICULAR; INTRALESIONAL; INTRAMUSCULAR; INTRAVENOUS; SOFT TISSUE at 11:56

## 2024-04-23 RX ADMIN — EPHEDRINE SULFATE 10 MG: 5 INJECTION INTRAVENOUS at 12:04

## 2024-04-23 RX ADMIN — PROPOFOL 70 MG: 10 INJECTION, EMULSION INTRAVENOUS at 11:47

## 2024-04-23 RX ADMIN — SODIUM CHLORIDE, POTASSIUM CHLORIDE, SODIUM LACTATE AND CALCIUM CHLORIDE: 600; 310; 30; 20 INJECTION, SOLUTION INTRAVENOUS at 10:11

## 2024-04-23 RX ADMIN — DIMENHYDRINATE 50 MG: 50 TABLET ORAL at 10:13

## 2024-04-23 ASSESSMENT — PAIN - FUNCTIONAL ASSESSMENT
PAIN_FUNCTIONAL_ASSESSMENT: FACE, LEGS, ACTIVITY, CRY, AND CONSOLABILITY (FLACC)
PAIN_FUNCTIONAL_ASSESSMENT: 0-10
PAIN_FUNCTIONAL_ASSESSMENT: FACE, LEGS, ACTIVITY, CRY, AND CONSOLABILITY (FLACC)
PAIN_FUNCTIONAL_ASSESSMENT: FACE, LEGS, ACTIVITY, CRY, AND CONSOLABILITY (FLACC)
PAIN_FUNCTIONAL_ASSESSMENT: 0-10
PAIN_FUNCTIONAL_ASSESSMENT: FACE, LEGS, ACTIVITY, CRY, AND CONSOLABILITY (FLACC)

## 2024-04-23 ASSESSMENT — PAIN SCALES - GENERAL: PAINLEVEL_OUTOF10: 0

## 2024-04-23 NOTE — DISCHARGE INSTRUCTIONS
SAME DAY SURGERY DISCHARGE INSTRUCTIONS    1.  Do not drive or operate hazardous machinery for 24 hours.    2.  Do not make important personal or business decisions for 24 hours.    3.  Do not drink alcoholic beverages for 24 hours.    4.  Do not smoke tobacco products for 24 hours.    5.  Eat light foods (Jell-O, soups, etc....) and drink plenty of fluids (water, Sprite, etc...) up to 8 glasses per day, as you can tolerate.    6.  Limit your activities for 24 hours.  Do not engage in heavy work until your surgeon gives you permission.      7.  Patient should not be left alone for 12-24 hours following surgical procedure.    8.  Wash hands before and after incision care.  It is important to practice good personal hygiene during the post op period.    9.  Call your surgeon for any questions regarding your surgery.    CYSTOSCOPY DISCHARGE INSTRUCTIONS    Possible burning during urination and/or blood tinged urine.    Drink 6-8 glasses of water for the next day or so.  (This helps to flush the urinary tract.)    Call Dr. Perry ([phone]) if you develop:    Fever over 100 degrees  Prolonged soreness/pain  Unusual bleeding/bruising  Unable to urinate or if urine is bloody  You cannot pass urine 8 hours after the test.  You have pain in your belly or your back just below your rib cage.  (This is called flank pain.)  You have frequent urge to urinate but can pass only small amounts of urine.    Call Dr. Perry office for follow-up appointment ([phone]).

## 2024-04-23 NOTE — ANESTHESIA POSTPROCEDURE EVALUATION
Department of Anesthesiology  Postprocedure Note    Patient: Jose Reyes  MRN: [id number]  YOB: 1940  Date of evaluation: 4/23/2024    Procedure Summary       Date: 04/23/24 Room / Location: [facility]    Anesthesia Start: 1142 Anesthesia Stop:     Procedure: CYSTOSCOPY URETEROSCOPY LASER - HLL WITH RIGHT URETERAL STENT PLACEMENT/EXCHANGE (Right) Diagnosis:       Kidney stones      (Kidney stones [N20.0])    Surgeons: Mehul Perry MD Responsible Provider: Diamante Causey APRN - MARILEE    Anesthesia Type: general ASA Status: 2            Anesthesia Type: No value filed.    Delfino Phase I: Delfino Score: 9    Delfino Phase II: Delfino Score: 9    Anesthesia Post Evaluation    Patient location during evaluation: PACU  Patient participation: complete - patient participated  Level of consciousness: awake and alert  Airway patency: patent  Nausea & Vomiting: no nausea and no vomiting  Cardiovascular status: blood pressure returned to baseline  Respiratory status: acceptable  Hydration status: euvolemic  Pain management: adequate and satisfactory to patient        No notable events documented.

## 2024-04-23 NOTE — OP NOTE
[address] [address]                            OPERATIVE REPORT      PATIENT NAME: MANDEEP YANES               : 1940  MED REC NO: [id number]                          ROOM: [facility]  ACCOUNT NO: [id number]                       ADMIT DATE: 2024  PROVIDER: Mehul Perry MD      DATE OF PROCEDURE:  2024    SURGEON:  Mehul Perry MD    ASSISTANT:  None.    PREOPERATIVE DIAGNOSIS:  Right ureteral calculus.    POSTOPERATIVE DIAGNOSIS:  Right ureteral calculus.    PROCEDURES:  Cystoscopy, right ureteroscopy, right holmium laser lithotripsy, and right ureteral stent exchange.    ANESTHESIA:  General.    COMPLICATIONS:  None.    ESTIMATED BLOOD LOSS:  Minimal.    SPECIMENS:  None.    PROSTHESIS:  A 6-St Lucian x 26 cm double-J ureteral stent.    DISPOSITION:  Stable.    FINDINGS:  Multiple right ureteral stones.    INDICATIONS:  The patient is an 83-year-old male with a 2-cm stone, here now for a staged right-sided holmium laser lithotripsy.    DESCRIPTION OF PROCEDURE:  The patient was taken back to the operating room after informed consent, including all risks, benefits, and alternatives obtained.  The patient was transferred from the [facility] onto the operating room table, where he was induced under general anesthesia, given IV Ancef for preoperative antibiotic prophylaxis.  To begin the case, he was prepped and draped in normal sterile fashion, placed in the dorsal lithotomy.  A 22-St Lucian sheath 30-degree lens passed through the urethra into the bladder.  Once in the bladder, we identified the right ureteral stent.  This was grasped and removed through the meatus.  A 0.035-inch wire was passed up.  We removed the stent.  A dual-lumen catheter was used to place additional Glidewire.  We then placed a flexible ureteroscope over the Glidewire up in the ureter.  We identified several stone fragments.  We were able

## 2024-04-23 NOTE — PROGRESS NOTES
MARILEE Bobby made aware of pt BP. No further orders, RN will continue to monitor patient and update Diamante on pt VS in phase 2 recovery.

## 2024-04-23 NOTE — ANESTHESIA PRE PROCEDURE
Time of last solid consumption: 2200                        Date of last liquid consumption: 04/22/24                        Date of last solid food consumption: 04/22/24    BMI:   Wt Readings from Last 3 Encounters:   04/23/24 59.2 kg (130 lb 9.6 oz)   04/15/24 64 kg (141 lb)   04/11/24 64.9 kg (143 lb)     Body mass index is 21.08 kg/m².    CBC:   Lab Results   Component Value Date/Time    WBC 8.4 04/15/2024 10:23 AM    RBC 4.42 04/15/2024 10:23 AM    RBC 5.24 04/08/2024 12:26 PM    HGB 13.1 04/15/2024 10:23 AM    HCT 38.7 04/15/2024 10:23 AM    MCV 87.6 04/15/2024 10:23 AM    RDW 12.8 04/15/2024 10:23 AM     04/15/2024 10:23 AM       CMP:   Lab Results   Component Value Date/Time     04/15/2024 10:23 AM    K 5.0 04/15/2024 10:23 AM     04/15/2024 10:23 AM    CO2 27 04/15/2024 10:23 AM    BUN 32 04/15/2024 10:23 AM    CREATININE 2.0 04/15/2024 10:23 AM    LABGLOM 33 04/15/2024 10:23 AM    GLUCOSE 186 04/15/2024 10:23 AM    GLUCOSE 167 04/08/2024 12:26 PM    PROT 6.5 12/06/2022 04:17 PM    CALCIUM 9.5 04/15/2024 10:23 AM    BILITOT 1.2 12/06/2022 04:17 PM    ALKPHOS 126 12/06/2022 04:17 PM    AST 32 12/06/2022 04:17 PM    ALT 14 12/06/2022 04:17 PM       POC Tests:   Recent Labs     04/23/24  1013   POCGLU 119*       Coags: No results found for: \"PROTIME\", \"INR\", \"APTT\"    HCG (If Applicable): No results found for: \"PREGTESTUR\", \"PREGSERUM\", \"HCG\", \"HCGQUANT\"     ABGs: No results found for: \"PHART\", \"PO2ART\", \"XGD5CQT\", \"FDP5HFU\", \"BEART\", \"H1NWMVSN\"     Type & Screen (If Applicable):  No results found for: \"LABABO\", \"LABRH\"    Drug/Infectious Status (If Applicable):  No results found for: \"HIV\", \"HEPCAB\"    COVID-19 Screening (If Applicable): No results found for: \"COVID19\"        Anesthesia Evaluation     no history of anesthetic complications:   Airway: Mallampati: II  TM distance: >3 FB   Neck ROM: full  Mouth opening: > = 3 FB   Dental: normal exam         Pulmonary:normal exam  breath

## 2024-04-23 NOTE — PROGRESS NOTES
MARILEE Bobby made aware of /56 and HR 51. Diamante ok for pt to go home if asymptomatic. RN will instruct pt and wife to monitor BP at home and if pt BP does not decrease or pt has concerns then he should go to ER. Pt at this time has no complaints and wife is ok to take pt home.

## 2024-04-23 NOTE — PROGRESS NOTES
Discharge Criteria    Inpatients must meet Criteria 1 through 7. All other patients are either YES or N/A. If a NO is chosen then Anesthesia or Surgeon must be notified.      1.  Minimum 30 minutes after last dose of sedative medication.    Yes      2.  Systolic BP between 90 - 160. Diastolic BP between 60 - 90.    No. MARILEE Bobby made aware and ok for pt to go home.       3.  Pulse between 60 - 120    No. MARILEE Bobby made aware and ok for pt to go home.       4.  Respirations between 8 - 25.    Yes      5.  SpO2 92% - 100%.    Yes      6.  Able to cough and swallow or return to baseline function.    Yes      7.  Alert and oriented or return to baseline mental status.    Yes      8.  Demonstrates controlled, coordinated movements, ambulates with steady gait, or return to baseline activity function.    Yes      9.  Minimal or no pain or nausea, or at a level tolerable and acceptable to patient.    Yes      10. Takes and retains oral fluids as allowed.    Yes      11. Procedural / perioperative site stable.  Minimal or no bleeding.    Yes          12. If GI endoscopy procedure, minimal or no abdominal distention or passing flatus.    N/A      13. Written discharge instructions and emergency telephone number provided.    Yes      14. Accompanied by a responsible adult.    Yes

## 2024-04-25 ENCOUNTER — OFFICE VISIT (OUTPATIENT)
Dept: UROLOGY | Age: 84
End: 2024-04-25
Payer: MEDICARE

## 2024-04-25 VITALS
BODY MASS INDEX: 21.95 KG/M2 | DIASTOLIC BLOOD PRESSURE: 60 MMHG | SYSTOLIC BLOOD PRESSURE: 125 MMHG | HEART RATE: 80 BPM | WEIGHT: 136 LBS | TEMPERATURE: 98.4 F

## 2024-04-25 DIAGNOSIS — N17.9 AKI (ACUTE KIDNEY INJURY) (HCC): ICD-10-CM

## 2024-04-25 DIAGNOSIS — N13.2 URETERAL STONE WITH HYDRONEPHROSIS: Primary | ICD-10-CM

## 2024-04-25 PROCEDURE — 99213 OFFICE O/P EST LOW 20 MIN: CPT | Performed by: NURSE PRACTITIONER

## 2024-04-25 PROCEDURE — 1123F ACP DISCUSS/DSCN MKR DOCD: CPT | Performed by: NURSE PRACTITIONER

## 2024-04-25 NOTE — PROGRESS NOTES
History  Referral for right flank, RLQ pain and MARYSE.  He denies any dysuria, gross hematuria, frequency or urgency.  He did have a KUB and renal ultrasound completed.  These films are independently reviewed and shows a 2cm calcification along the course of the proximal right ureter.  The renal ultrasound shows right hydroureteronephrosis.  I do not see any right ureteral jet.  The hospital was not able to complete a CT scan because the machine was down. Creatinine is 1.8.  He did leave the ER yesterday AMA because he did not want to be admitted.  He has not had anything to eat or drink since before midnight except for Zofran this morning.     We did send him for a stat CT scan and repeat labs.  This showed a 2 cm proximal right ureteral stone with significant hydronephrosis.  Creatinine is 2.3.  GFR 28.  WBC 11.6.    4/10/2024 right HLL with ureteral stent placement and cystoscopy follow-up      4/23/2024 right HLL and stent exchange     Today  Here today for stent removal following staged right holmium laser lithotripsy for up to 2 cm stone burden.  He denies fevers, nausea or vomiting.  He tolerated stent removal with minimal complaints.    You may experience waves of pain and/or nausea for the next 24-72 hrs.  You may also experience burning with urination, frequency, urgency, bladder spasms, and blood in the urine. All of this should continue to improve over the next several days. The blood in the urine can last up to two weeks.      1) take ibuprofen (motrin) 600 mg (3 of the 200mg tabs) every 6 hours WITH FOOD for the next 72 hours.    2) drink at least 80 oz fluid (water, juice, Gatorade - NOT tea, coffee, soda pop) daily    For pain NOT controlled by ibuprofen use OTC acetaminophen as directed as needed.       Call our office [phone] or go to ER (if after normal office hours) if you develop fever, intractable vomiting, severe/intolerable pain.

## 2024-04-25 NOTE — PROGRESS NOTES
Pt had ureteral stent placed on 4/23/2024 following right ESWL.     Stent removed via string in office today without difficulty. Pt tolerated removal well.   PE: resting comfortably, no acute distress.   Pt advised to call office if develops pain, dysuria, fever, or for any concerns.    Follow up in 3 months with KUB prior.

## 2024-12-22 NOTE — H&P
Patient:  Jose Reyes  MRN: [id number]    Chief Complaint: right flank and RLQ abdominal pain, MARYSE    HISTORY OF PRESENT ILLNESS:   The patient is a 83 y.o. male who presented to our office today with complaints of right flank, RLQ pain and MARYSE.  He denies any dysuria, gross hematuria, frequency or urgency.  He did have a KUB and renal ultrasound completed.  These films are independently reviewed and shows a 2cm calcification along the course of the proximal right ureter.  The renal ultrasound shows right hydroureteronephrosis.  I do not see any right ureteral jet.  The hospital was not able to complete a CT scan because the machine was down. Creatinine is 1.8.  He did leave the ER yesterday AMA because he did not want to be admitted.  He has not had anything to eat or drink since before midnight except for Zofran this morning.     We did send him for a stat CT scan and repeat labs.  This film was independently reviewed and shows a 2 cm proximal right ureteral stone with significant hydronephrosis.  Creatinine is 2.3.  GFR 28.  WBC 11.6. Due to renal function and being a diabetic we did feel it was best to admit for at least observation following HLL.     Past Medical History:    Past Medical History:   Diagnosis Date    Benign tumor of throat     at age 21    Diabetes mellitus (HCC)     Glaucoma     Kidney stone     Pneumonia        Past Surgical History:    Past Surgical History:   Procedure Laterality Date    EMG  6/20/2023    LITHOTRIPSY N/A         Medications:    Scheduled Meds:  Continuous Infusions:  PRN Meds:.    Allergies:    Gabapentin, Ciprofloxacin, Levaquin [levofloxacin], and Moxifloxacin    Social History:    Social History     Socioeconomic History    Marital status:      Spouse name: Not on file    Number of children: Not on file    Years of education: Not on file    Highest education level: Not on file   Occupational History    Not on file   Tobacco Use    Smoking status: Former     Types:  Prophylactic measure

## (undated) DEVICE — FIBER LASER EXCALIBUR HOLM

## (undated) DEVICE — URETEROSCOPE (SEE ITEM COMMENTS) DIGITAL URS FLX SU MODEL E NORM DEFL AXIS II

## (undated) DEVICE — SOLUTION IRRIG 3000ML 0.9% SOD CHL USP UROMATIC PLAS CONT

## (undated) DEVICE — GUIDEWIRE VASC NITINOL HYDROPHIL STR 3 CM 0.035 INX150 CM STD NIT ZIPWIRE

## (undated) DEVICE — DUAL LUMEN URETERAL CATHETER

## (undated) DEVICE — MASTISOL ADHESIVE LIQ 2/3ML

## (undated) DEVICE — SOLUTION IRRIG 1000ML 0.9% SOD CHL USP POUR PLAS BTL

## (undated) DEVICE — SINGLE ACTION PUMPING SYSTEM

## (undated) DEVICE — ADAPTER URO SCP UROLOK LL

## (undated) DEVICE — MERCY TIFFIN CYSTO-LF: Brand: MEDLINE INDUSTRIES, INC.

## (undated) DEVICE — STRIP,CLOSURE,WOUND,MEDI-STRIP,1/2X4: Brand: MEDLINE